# Patient Record
(demographics unavailable — no encounter records)

---

## 2025-03-04 NOTE — ASSESSMENT
[FreeTextEntry1] : I discussed with the patient with the aid of diagrams, reviewed the findings on history and physical examination, and reviewed the imaging studies in detail. We discussed my impression that her intrauterine masses are benign leiomyoma. We also discussed the possibility of atypical leiomyomas and sarcoma. The risk of sarcoma in patients presenting for fibroid surgery is between 1:350-1:1000.  	 Medical vs. surgical management of fibroids discussed. Medical management includes hormonal therapies. Since many of these are effectively contraceptive options, they are not a great choice for patients attempting pregnancy. Uterine artery embolization is another non-surgical option, however can also result in decreased fertility. Surgical options include hysterectomy and myomectomy. While hysterectomy represents definitive management, myomectomy is the surgical option for patients who desire future fertility. The risk of the fibroids "growing back" after myomectomy was also discussed.  Surgical approach of hysterectomy also discussed- including minimally invasive and open approaches. Minimally invasive approach will be attempted if appropriate, however if the size of the uterus/fibroids exceed that which is appropriate laparoscopically, an open abdominal approach will be selected.  Complications that include, but are not limited to: bleeding, infection, injury to other organs including bowel, bladder, ureters, blood vessels, nerves; infections, blood clots, lymphedema, pneumonia, wound complications and prolonged hospital stay have all been discussed with the patient. Whenever minimally invasive surgery is attempted, there is a chance of needing to convert to laparotomy. The risk of occult injury requiring additional surgery also discussed. I have also provided her with the diagrams.    Surgical scheduling was discussed and instructions for optimization prior to surgery were given. will follow the Enhanced Recovery After Surgery (ERAS) protocol.   She will choose a surgical date. Slide review and medical clearance.  No aspirin or NSAID products for 1 week prior.   [] Medical clearance [] Pre-op labs [] TLH/BS

## 2025-03-04 NOTE — PHYSICAL EXAM
[Chaperone Present] : A chaperone was present in the examining room during all aspects of the physical examination [Abnormal] : Uterus: Abnormal [Normal] : Anus and perineum: Normal sphincter tone, no masses, no prolapse. [FreeTextEntry2] : Kavitha [de-identified] : 12cm multifibroid uterus

## 2025-03-04 NOTE — PHYSICAL EXAM
[Chaperone Present] : A chaperone was present in the examining room during all aspects of the physical examination [Abnormal] : Uterus: Abnormal [Normal] : Anus and perineum: Normal sphincter tone, no masses, no prolapse. [FreeTextEntry2] : Kavitha [de-identified] : 12cm multifibroid uterus

## 2025-03-04 NOTE — HISTORY OF PRESENT ILLNESS
[FreeTextEntry1] : Problem: 1) Fibroids  Previous Therapies: 1) TVUS 6/2024     a) uterus 10.2 x 7.0 x 6.9 cm.      b) 3.7 x 4.3 x 5.4 cm submucosal fibroid in mid uterine segment.     c) 1.5 x 1.2 x 1.2 cm submucosal/intramural fibroid in mid uterine segment.     d) ENDOMETRIUM: Distorted by uterine fibroids. Evaluation is limited, questionably increased in thickness measuring 1.5     c) RO 3.8cm; LO 3.0cm

## 2025-03-25 NOTE — ASSESSMENT
[FreeTextEntry1] : Patient was scheduled today to discuss her upcoming surgery.  -	Indications for the surgery and risks, alternative and benefits were once again discussed in detail. She had an opportunity to ask questions. -	Her medical clearance has not been done yet. She is seeing cardiology tomorrow and her PCP Saturday. -	Her current medications were reviewed, and no medications need to be held pre-operatively.  -	Labs to be done 2/26/25: need to review pre-op -	Pre-operative imaging: TVS reviewed -	Pathology review: N/A  The pre-operative booklet was reviewed so that she would understand where to find important instructions for the day before and the day of surgery.

## 2025-03-25 NOTE — REASON FOR VISIT
[Home] : at home, [unfilled] , at the time of the visit. [Medical Office: ([facility])___] : at the medical office located in  [Telehealth (audio & video)] : This visit was provided via telehealth using real-time 2-way audio visual technology. [Verbal consent obtained from patient] : the patient, [unfilled] [FreeTextEntry1] : Preop follow-up  47yo here for preop follow-up for TLH/BS for fibroids.   Patient is allergic to garlic and is having a reaction to having garlic touch her skin recently. She has no other acute complaints.

## 2025-05-02 NOTE — ASSESSMENT
[FreeTextEntry1] : S/P TLH, BS, Cystoscopy meeting postop milestones Benign Pathology  [] post-op precautions given [] f/u with Dr. Reese in 3 weeks

## 2025-05-02 NOTE — HISTORY OF PRESENT ILLNESS
[FreeTextEntry1] : Problem: 1) Fibroids  Previous Therapies: 1) TVUS 6/2024     a) uterus 10.2 x 7.0 x 6.9 cm.      b) 3.7 x 4.3 x 5.4 cm submucosal fibroid in mid uterine segment.     c) 1.5 x 1.2 x 1.2 cm submucosal/intramural fibroid in mid uterine segment.     d) ENDOMETRIUM: Distorted by uterine fibroids. Evaluation is limited, questionably increased in thickness measuring 1.5     c) RO 3.8cm; LO 3.0cm 2) s/p TLH/BS, cystoscopy     a) Uterus, cervix, bilateral fallopian tubes: Corpus: - Leiomyoma, cellular - Proliferative endometrium  Cervix: - No pathological diagnosis Fallopian tube, right: - No pathological diagnosis Fallopian tube, left: - No pathological diagnosis

## 2025-05-02 NOTE — REASON FOR VISIT
[FreeTextEntry1] : 1 week post-op  49yo s/p TLH/BS, cystoscopy here for 1 week post-op visit. Pain controlled. + BMs Pathology discussed

## 2025-05-23 NOTE — PHYSICAL EXAM
[FreeTextEntry2] : Kelly [Absent] : Uterus: Absent [Normal] : Anus and perineum: Normal sphincter tone, no masses, no prolapse. [de-identified] : incision c/d/i [de-identified] : vaginal cuff well healed

## 2025-05-23 NOTE — ASSESSMENT
[FreeTextEntry1] : Appropriate 1 month recovery  Okay to resume normal activities. Patient requested return to work paperwork.  Recommend 8 weeks total before intercourse.  Follow up with Dr. He  Reconskeerthi as indicated.

## 2025-05-23 NOTE — HISTORY OF PRESENT ILLNESS
[FreeTextEntry1] : Problem: 1) Fibroids  Previous Therapies: 1) TVUS 6/2024     a) uterus 10.2 x 7.0 x 6.9 cm.      b) 3.7 x 4.3 x 5.4 cm submucosal fibroid in mid uterine segment.     c) 1.5 x 1.2 x 1.2 cm submucosal/intramural fibroid in mid uterine segment.     d) ENDOMETRIUM: Distorted by uterine fibroids. Evaluation is limited, questionably increased in thickness measuring 1.5     c) RO 3.8cm; LO 3.0cm 2) s/p TLH/BS, cystoscopy     a) corpus: leiomyoma cellular, proliferative endometrial      b) cervix, bilateral fallopian tubes: benign

## 2025-05-23 NOTE — REASON FOR VISIT
[FreeTextEntry1] : 1 month post-op  49yo s/p TLH/BS, cystoscopy here for 1 month post-op visit. Pain controlled. + BMs Pathology discussed at last visit. Patient reports she is no longer requiring pain medication. Feels a small amount of bloating but otherwise has no acute complaints.

## 2025-05-23 NOTE — PHYSICAL EXAM
[FreeTextEntry2] : Kelly [Absent] : Uterus: Absent [Normal] : Anus and perineum: Normal sphincter tone, no masses, no prolapse. [de-identified] : incision c/d/i [de-identified] : vaginal cuff well healed